# Patient Record
Sex: FEMALE | Race: WHITE | NOT HISPANIC OR LATINO
[De-identification: names, ages, dates, MRNs, and addresses within clinical notes are randomized per-mention and may not be internally consistent; named-entity substitution may affect disease eponyms.]

---

## 2020-03-02 ENCOUNTER — HEALTH MAINTENANCE LETTER (OUTPATIENT)
Age: 70
End: 2020-03-02

## 2020-06-03 ENCOUNTER — TRANSFERRED RECORDS (OUTPATIENT)
Dept: HEALTH INFORMATION MANAGEMENT | Facility: CLINIC | Age: 70
End: 2020-06-03

## 2020-12-14 ENCOUNTER — HEALTH MAINTENANCE LETTER (OUTPATIENT)
Age: 70
End: 2020-12-14

## 2021-04-18 ENCOUNTER — HEALTH MAINTENANCE LETTER (OUTPATIENT)
Age: 71
End: 2021-04-18

## 2021-05-27 ENCOUNTER — RECORDS - HEALTHEAST (OUTPATIENT)
Dept: ADMINISTRATIVE | Facility: CLINIC | Age: 71
End: 2021-05-27

## 2021-10-02 ENCOUNTER — HEALTH MAINTENANCE LETTER (OUTPATIENT)
Age: 71
End: 2021-10-02

## 2022-03-19 ENCOUNTER — HEALTH MAINTENANCE LETTER (OUTPATIENT)
Age: 72
End: 2022-03-19

## 2022-05-14 ENCOUNTER — HEALTH MAINTENANCE LETTER (OUTPATIENT)
Age: 72
End: 2022-05-14

## 2022-05-24 ENCOUNTER — TRANSFERRED RECORDS (OUTPATIENT)
Dept: HEALTH INFORMATION MANAGEMENT | Facility: CLINIC | Age: 72
End: 2022-05-24
Payer: COMMERCIAL

## 2022-09-03 ENCOUNTER — HEALTH MAINTENANCE LETTER (OUTPATIENT)
Age: 72
End: 2022-09-03

## 2023-06-03 ENCOUNTER — HEALTH MAINTENANCE LETTER (OUTPATIENT)
Age: 73
End: 2023-06-03

## 2023-12-15 ENCOUNTER — TRANSFERRED RECORDS (OUTPATIENT)
Dept: HEALTH INFORMATION MANAGEMENT | Facility: CLINIC | Age: 73
End: 2023-12-15

## 2024-11-23 ENCOUNTER — HEALTH MAINTENANCE LETTER (OUTPATIENT)
Age: 74
End: 2024-11-23

## 2025-01-29 ENCOUNTER — OFFICE VISIT (OUTPATIENT)
Dept: FAMILY MEDICINE | Facility: CLINIC | Age: 75
End: 2025-01-29
Payer: COMMERCIAL

## 2025-01-29 VITALS
OXYGEN SATURATION: 100 % | BODY MASS INDEX: 19.5 KG/M2 | HEART RATE: 85 BPM | WEIGHT: 114.2 LBS | RESPIRATION RATE: 20 BRPM | HEIGHT: 64 IN | DIASTOLIC BLOOD PRESSURE: 64 MMHG | SYSTOLIC BLOOD PRESSURE: 118 MMHG | TEMPERATURE: 98.3 F

## 2025-01-29 DIAGNOSIS — C91.10 CHRONIC LYMPHOCYTIC LEUKEMIA (H): Primary | ICD-10-CM

## 2025-01-29 DIAGNOSIS — M81.0 OSTEOPOROSIS, UNSPECIFIED OSTEOPOROSIS TYPE, UNSPECIFIED PATHOLOGICAL FRACTURE PRESENCE: ICD-10-CM

## 2025-01-29 DIAGNOSIS — N95.2 VAGINAL ATROPHY: ICD-10-CM

## 2025-01-29 DIAGNOSIS — D50.0 IRON DEFICIENCY ANEMIA DUE TO CHRONIC BLOOD LOSS: ICD-10-CM

## 2025-01-29 DIAGNOSIS — E78.2 MIXED HYPERLIPIDEMIA: ICD-10-CM

## 2025-01-29 DIAGNOSIS — B00.9 HSV (HERPES SIMPLEX VIRUS) INFECTION: ICD-10-CM

## 2025-01-29 PROBLEM — M70.61 GREATER TROCHANTERIC BURSITIS OF RIGHT HIP: Status: ACTIVE | Noted: 2024-06-26

## 2025-01-29 PROBLEM — M25.551 CHRONIC HIP PAIN, RIGHT: Status: ACTIVE | Noted: 2024-06-26

## 2025-01-29 PROBLEM — G89.29 CHRONIC HIP PAIN, RIGHT: Status: ACTIVE | Noted: 2024-06-26

## 2025-01-29 PROBLEM — E78.5 HYPERLIPIDEMIA: Status: ACTIVE | Noted: 2017-02-27

## 2025-01-29 PROCEDURE — 99204 OFFICE O/P NEW MOD 45 MIN: CPT | Performed by: INTERNAL MEDICINE

## 2025-01-29 RX ORDER — METHOCARBAMOL 500 MG/1
500 TABLET, FILM COATED ORAL
COMMUNITY
Start: 2024-05-28

## 2025-01-29 RX ORDER — MINOXIDIL 2.5 MG/1
0.5 TABLET ORAL
COMMUNITY
Start: 2024-12-03

## 2025-01-29 RX ORDER — IBUPROFEN 200 MG
200-400 TABLET ORAL
COMMUNITY

## 2025-01-29 RX ORDER — VALACYCLOVIR HYDROCHLORIDE 1 G/1
2000 TABLET, FILM COATED ORAL 2 TIMES DAILY
COMMUNITY
Start: 2025-01-29

## 2025-01-29 RX ORDER — TRAZODONE HYDROCHLORIDE 50 MG/1
1 TABLET, FILM COATED ORAL AT BEDTIME
COMMUNITY
Start: 2024-05-28 | End: 2025-05-28

## 2025-01-29 ASSESSMENT — PAIN SCALES - GENERAL: PAINLEVEL_OUTOF10: NO PAIN (0)

## 2025-01-29 NOTE — PATIENT INSTRUCTIONS
Download [employer]  Username: JPIERMAN     I will send you a [employer] message about Cardiology and CT calcium score    Thank you for coming to see me today! Here are a couple of pieces of information about my schedule and communication practices.     I am not in the clinic on Thursdays, although I do try to check in on my inbox even when I'm  out. Non-urgent calls and messages received on Thursdays may not be addressed until I am back in the office. Urgent calls will be addressed by a covering clinician.       If lab work was done today as part of your evaluation you will generally be contacted via [employer], mail, or phone with the results within 7-10 days.  If there is an alarming/concerning result we will contact you by phone. Lab results come back at varying times, I generally wait until all labs are resulted before making comments on results. Please note, labs are automatically released to [employer] once available, but it may take a couple of days for my interpretation note to appear.     I try very hard to respond to medical messages within 3 business days of receiving them- although it can take longer because all messages go to our clinic nurses first who sometimes address the concern themselves. Occasionally it takes me longer if I am trying to figure out the best way to respond and need to seek guidance, do some research or dig deeper into your medical history to come up with a helpful response.     If you need refills please contact your pharmacist. They will send a refill request to me to review. Please allow 3-5 business days for us to respond to all refill requests.     Please call, send a medical message, or submit an e-visit with any questions or concerns. Thank you for trusting me to be part of your healthcare team!    Atiya Hinojosa, DO  Internal Medicine - Pediatrics Physician  [facility]

## 2025-01-29 NOTE — PROGRESS NOTES
Assessment & Plan     (C91.10) Chronic lymphocytic leukemia (H)  (primary encounter diagnosis)  Comment: Sees Dr. Toro HP Blackman Stage 0, CLL. Dx 2010. Weekly rituxan x 4 in Oct 2010 (for profound fatigue)    (N95.2) Vaginal atrophy  Comment: Uses estradiol cream prn.    (B00.9) HSV (herpes simplex virus) infection  Comment: Takes prn Valtrex.  Plan: valACYclovir (VALTREX) 1000 mg tablet    (D50.0) Iron deficiency anemia due to chronic blood loss  Comment: New since seeing dermatologist- labs checked for hair loss; unexplained FeDA- recommend colonoscopy- if negative, proceed with EGD.  Plan: Adult GI  Referral - Procedure Only    (E78.2) Mixed hyperlipidemia  Comment: [facility]- recommend checking CT calcium score- worries about statin use due to fibromyalgia and chronic muscle aches.  Plan: CT Coronary Calcium Scan    (M81.0) Osteoporosis, unspecified osteoporosis type, unspecified pathological fracture presence  Comment: Using supplements only, no medications.               Patient Instructions   Download [employer]gola  Username: DIANA     I will send you a [employer] message about Cardiology and CT calcium score    Thank you for coming to see me today! Here are a couple of pieces of information about my schedule and communication practices.     I am not in the clinic on Thursdays, although I do try to check in on my inbox even when I'm  out. Non-urgent calls and messages received on Thursdays may not be addressed until I am back in the office. Urgent calls will be addressed by a covering clinician.       If lab work was done today as part of your evaluation you will generally be contacted via [employer], mail, or phone with the results within 7-10 days.  If there is an alarming/concerning result we will contact you by phone. Lab results come back at varying times, I generally wait until all labs are resulted before making comments on results. Please note, labs are automatically released to [employer]  once available, but it may take a couple of days for my interpretation note to appear.     I try very hard to respond to medical messages within 3 business days of receiving them- although it can take longer because all messages go to our clinic nurses first who sometimes address the concern themselves. Occasionally it takes me longer if I am trying to figure out the best way to respond and need to seek guidance, do some research or dig deeper into your medical history to come up with a helpful response.     If you need refills please contact your pharmacist. They will send a refill request to me to review. Please allow 3-5 business days for us to respond to all refill requests.     Please call, send a medical message, or submit an e-visit with any questions or concerns. Thank you for trusting me to be part of your healthcare team!    Atiya Hinojosa, DO  Internal Medicine - Pediatrics Physician  [facility]        Juan Monroy is a 74 year old, presenting for the following health issues:  Establish Care and Consult (health)        1/29/2025     9:23 AM   Additional Questions   Roomed by Tia   Accompanied by alone         1/29/2025     9:23 AM   Patient Reported Additional Medications   Patient reports taking the following new medications none     History of Present Illness       Reason for visit:  New patient visit   She is taking medications regularly.     Was at  for years.    Feels like she is spiraling.    Concerned about:    - Hyperlipidemia  - Fibromyalgia, aches all the time  - Likes natural medicine    A year ago was diagnosed with right trochanteric bursitis- had 3 injections (all steroid) and went to [place] and had MRI and it showed mild to moderate arthritis in right hip.  Next step is to put a steroid shot in right hip.  And then is hoping will exercise more.    Has had difficulty sleeping because of pain at night.    Hasn't yet used Robaxin yet.  Trazodone keeps her  "asleep for only about 4 hours at a time.  Takes Deep Sleep (2 before bedtime).    Was also having hair loss- saw [facility] who did lab studies and showed iron deficiency.        Objective    /64 (BP Location: Right arm, Patient Position: Sitting, Cuff Size: Adult Regular)   Pulse 85   Temp 98.3  F (36.8  C) (Temporal)   Resp 20   Ht 1.626 m (5' 4\")   Wt 51.8 kg (114 lb 3.2 oz)   SpO2 100%   BMI 19.60 kg/m    Body mass index is 19.6 kg/m .  Physical Exam   GENERAL: alert and no distress  CV: regular rate and rhythm, normal S1 S2, no S3 or S4, no murmur, click or rub, no peripheral edema  MS: no gross musculoskeletal defects noted, no edema  PSYCH: mentation appears normal, affect normal/bright            Signed Electronically by: Atiya Hinojosa, DO    "

## 2025-02-03 ENCOUNTER — MYC MEDICAL ADVICE (OUTPATIENT)
Dept: FAMILY MEDICINE | Facility: CLINIC | Age: 75
End: 2025-02-03
Payer: COMMERCIAL

## 2025-02-03 ENCOUNTER — TELEPHONE (OUTPATIENT)
Dept: GASTROENTEROLOGY | Facility: CLINIC | Age: 75
End: 2025-02-03
Payer: COMMERCIAL

## 2025-02-03 NOTE — TELEPHONE ENCOUNTER
"Endoscopy Scheduling Screen    Have you had any respiratory illness or flu-like symptoms in the last 10 days?  No    What is your communication preference for Instructions and/or Bowel Prep?   MyChart    What insurance is in the chart?  Other:  [facility]    Ordering/Referring Provider: Atiya Hinojosa,      (If ordering provider performs procedure, schedule with ordering provider unless otherwise instructed. )    BMI: Estimated body mass index is 19.6 kg/m  as calculated from the following:    Height as of 1/29/25: 1.626 m (5' 4\").    Weight as of 1/29/25: 51.8 kg (114 lb 3.2 oz).     Sedation Ordered  moderate sedation.   If patient BMI > 50 do not schedule in ASC.    If patient BMI > 45 do not schedule at [facility].    Are you taking methadone or Suboxone?  NO, No RN review required.    Have you been diagnosed and are being treated for severe PTSD or severe anxiety?  NO, No RN review required.    Are you taking any prescription medications for pain 3 or more times per week?   NO, No RN review required.    Do you have a history of malignant hyperthermia?  No    (Females) Are you currently pregnant?   No     Have you been diagnosed or told you have pulmonary hypertension?   No    Do you have an LVAD?  No    Have you been told you have moderate to severe sleep apnea?  No.    Have you been told you have COPD, asthma, or any other lung disease?  No    Do you have any heart conditions?  No     Have you ever had or are you waiting for an organ transplant?  No. Continue scheduling, no site restrictions.    Have you had a stroke or transient ischemic attack (TIA aka \"mini stroke\" in the last 6 months?   No    Have you been diagnosed with or been told you have cirrhosis of the liver?   No.    Are you currently on dialysis?   No    Do you need assistance transferring?   No    BMI: Estimated body mass index is 19.6 kg/m  as calculated from the following:    Height as of 1/29/25: 1.626 m (5' 4\").    Weight as of " 1/29/25: 51.8 kg (114 lb 3.2 oz).     Is patients BMI > 40 and scheduling location [facility]?  No    Do you take an injectable or oral medication for weight loss or diabetes (excluding insulin)?  No    Do you take the medication Naltrexone?  No    Do you take blood thinners?  No       Prep   Are you currently on dialysis or do you have chronic kidney disease?  No    Do you have a diagnosis of diabetes?  No    Do you have a diagnosis of cystic fibrosis (CF)?  No    On a regular basis do you go 3 -5 days between bowel movements?  No    BMI > 40?  No    Preferred Pharmacy:    [id number] [address]  [address] [facility] [address]  Phone: [phone] Fax: [phone]      Final Scheduling Details     Procedure scheduled  Colonoscopy    Surgeon:  Gurjit Genao     Date of procedure:  3/5/25     Pre-OP / PAC:   No - Not required for this site.    Location  [facility] - ASC - Patient preference.    Sedation   Moderate Sedation - Per order.      Patient Reminders:   You will receive a call from a Nurse to review instructions and health history.  This assessment must be completed prior to your procedure.  Failure to complete the Nurse assessment may result in the procedure being cancelled.      On the day of your procedure, please designate an adult(s) who can drive you home stay with you for the next 24 hours. The medicines used in the exam will make you sleepy. You will not be able to drive.      You cannot take public transportation, ride share services, or non-medical taxi service without a responsible caregiver.  Medical transport services are allowed with the requirement that a responsible caregiver will receive you at your destination.  We require that drivers and caregivers are confirmed prior to your procedure.

## 2025-02-04 ENCOUNTER — ANCILLARY PROCEDURE (OUTPATIENT)
Dept: GENERAL RADIOLOGY | Facility: CLINIC | Age: 75
End: 2025-02-04
Attending: PHYSICIAN ASSISTANT
Payer: COMMERCIAL

## 2025-02-04 ENCOUNTER — NURSE TRIAGE (OUTPATIENT)
Dept: FAMILY MEDICINE | Facility: CLINIC | Age: 75
End: 2025-02-04
Payer: COMMERCIAL

## 2025-02-04 ENCOUNTER — OFFICE VISIT (OUTPATIENT)
Dept: URGENT CARE | Facility: URGENT CARE | Age: 75
End: 2025-02-04
Payer: COMMERCIAL

## 2025-02-04 VITALS
HEIGHT: 64 IN | WEIGHT: 114 LBS | HEART RATE: 93 BPM | DIASTOLIC BLOOD PRESSURE: 79 MMHG | BODY MASS INDEX: 19.46 KG/M2 | RESPIRATION RATE: 16 BRPM | TEMPERATURE: 98.9 F | SYSTOLIC BLOOD PRESSURE: 142 MMHG | OXYGEN SATURATION: 97 %

## 2025-02-04 DIAGNOSIS — R10.11 RIGHT UPPER QUADRANT ABDOMINAL PAIN: ICD-10-CM

## 2025-02-04 DIAGNOSIS — R10.11 RIGHT UPPER QUADRANT ABDOMINAL PAIN: Primary | ICD-10-CM

## 2025-02-04 LAB
ALBUMIN SERPL BCG-MCNC: 4.4 G/DL (ref 3.5–5.2)
ALBUMIN UR-MCNC: NEGATIVE MG/DL
ALP SERPL-CCNC: 86 U/L (ref 40–150)
ALT SERPL W P-5'-P-CCNC: 44 U/L (ref 0–50)
ANION GAP SERPL CALCULATED.3IONS-SCNC: 13 MMOL/L (ref 7–15)
APPEARANCE UR: CLEAR
AST SERPL W P-5'-P-CCNC: 37 U/L (ref 0–45)
BACTERIA #/AREA URNS HPF: ABNORMAL /HPF
BILIRUB SERPL-MCNC: 0.6 MG/DL
BILIRUB UR QL STRIP: NEGATIVE
BUN SERPL-MCNC: 11.9 MG/DL (ref 8–23)
CALCIUM SERPL-MCNC: 9.7 MG/DL (ref 8.8–10.4)
CHLORIDE SERPL-SCNC: 98 MMOL/L (ref 98–107)
COLOR UR AUTO: YELLOW
CREAT SERPL-MCNC: 0.54 MG/DL (ref 0.51–0.95)
CRP SERPL-MCNC: <3 MG/L
EGFRCR SERPLBLD CKD-EPI 2021: >90 ML/MIN/1.73M2
ERYTHROCYTE [DISTWIDTH] IN BLOOD BY AUTOMATED COUNT: 14.3 % (ref 10–15)
GLUCOSE SERPL-MCNC: 106 MG/DL (ref 70–99)
GLUCOSE UR STRIP-MCNC: NEGATIVE MG/DL
HCO3 SERPL-SCNC: 24 MMOL/L (ref 22–29)
HCT VFR BLD AUTO: 40.9 % (ref 35–47)
HGB BLD-MCNC: 13.6 G/DL (ref 11.7–15.7)
HGB UR QL STRIP: ABNORMAL
KETONES UR STRIP-MCNC: NEGATIVE MG/DL
LEUKOCYTE ESTERASE UR QL STRIP: NEGATIVE
MCH RBC QN AUTO: 30.2 PG (ref 26.5–33)
MCHC RBC AUTO-ENTMCNC: 33.3 G/DL (ref 31.5–36.5)
MCV RBC AUTO: 91 FL (ref 78–100)
MUCOUS THREADS #/AREA URNS LPF: PRESENT /LPF
NITRATE UR QL: NEGATIVE
PH UR STRIP: 5.5 [PH] (ref 5–7)
PLATELET # BLD AUTO: 296 10E3/UL (ref 150–450)
POTASSIUM SERPL-SCNC: 4.1 MMOL/L (ref 3.4–5.3)
PROT SERPL-MCNC: 7.3 G/DL (ref 6.4–8.3)
RBC # BLD AUTO: 4.51 10E6/UL (ref 3.8–5.2)
RBC #/AREA URNS AUTO: ABNORMAL /HPF
SODIUM SERPL-SCNC: 135 MMOL/L (ref 135–145)
SP GR UR STRIP: 1.02 (ref 1–1.03)
SQUAMOUS #/AREA URNS AUTO: ABNORMAL /LPF
UROBILINOGEN UR STRIP-ACNC: 0.2 E.U./DL
WBC # BLD AUTO: 20.7 10E3/UL (ref 4–11)
WBC #/AREA URNS AUTO: ABNORMAL /HPF

## 2025-02-04 PROCEDURE — 80053 COMPREHEN METABOLIC PANEL: CPT | Performed by: PHYSICIAN ASSISTANT

## 2025-02-04 PROCEDURE — 86140 C-REACTIVE PROTEIN: CPT | Performed by: PHYSICIAN ASSISTANT

## 2025-02-04 PROCEDURE — 36415 COLL VENOUS BLD VENIPUNCTURE: CPT | Performed by: PHYSICIAN ASSISTANT

## 2025-02-04 PROCEDURE — 74019 RADEX ABDOMEN 2 VIEWS: CPT | Mod: TC | Performed by: RADIOLOGY

## 2025-02-04 PROCEDURE — 85027 COMPLETE CBC AUTOMATED: CPT | Performed by: PHYSICIAN ASSISTANT

## 2025-02-04 PROCEDURE — 81001 URINALYSIS AUTO W/SCOPE: CPT | Performed by: PHYSICIAN ASSISTANT

## 2025-02-04 PROCEDURE — 99214 OFFICE O/P EST MOD 30 MIN: CPT | Performed by: PHYSICIAN ASSISTANT

## 2025-02-04 RX ORDER — OMEPRAZOLE 20 MG/1
20 CAPSULE, DELAYED RELEASE ORAL DAILY
Qty: 30 CAPSULE | Refills: 0 | Status: SHIPPED | OUTPATIENT
Start: 2025-02-04 | End: 2025-03-06

## 2025-02-04 ASSESSMENT — PAIN SCALES - GENERAL: PAINLEVEL_OUTOF10: MODERATE PAIN (4)

## 2025-02-04 NOTE — TELEPHONE ENCOUNTER
"Care Advice:  Go to ED/UCC now (or to office with pcp approval)    Disposition:  Patient agreed to go to an urgent care at the  right away.  Was seen by pcp to establish care last week.      Reason for Disposition   MILD TO MODERATE constant pain lasting > 2 hours, and age > 60 years    Additional Information   Negative: Passed out (e.g., fainted, lost consciousness, blacked out and was not responding)   Negative: Shock suspected (e.g., cold/pale/clammy skin, too weak to stand, low BP, rapid pulse)   Negative: Sounds like a life-threatening emergency to the triager   Negative: Followed an abdomen (stomach) injury   Negative: Chest pain   Negative: Abdominal pain and pregnant < 20 weeks   Negative: Abdominal pain and pregnant 20 or more weeks   Negative: Pain is mainly in upper abdomen (if needed ask: 'is it mainly above the belly button?')   Negative: Abdomen bloating or swelling are main symptoms   Negative: SEVERE abdominal pain (e.g., excruciating)   Negative: Vomiting red blood or black (coffee ground) material   Negative: Blood in bowel movements  (Exception: Blood on surface of BM with constipation.)   Negative: Black or tarry bowel movements  (Exception: Chronic-unchanged black-grey BMs AND is taking iron pills or Pepto-Bismol.)   Negative: MILD TO MODERATE constant pain lasting > 2 hours   Negative: Vomiting bile (green color)   Negative: Patient sounds very sick or weak to the triager    Answer Assessment - Initial Assessment Questions  1. LOCATION: \"Where does it hurt?\"      Center of abdomen towards right below the breast bone.  The pain is consistent more in the center. However, as speaking to nurse, pain is shifting towards right sided of the abdomen.     2. RADIATION: \"Does the pain shoot anywhere else?\" (e.g., chest, back)      More center of the abdomen    3. ONSET: \"When did the pain begin?\" (e.g., minutes, hours or days ago)       Since Thursday or Friday.  Since then, it has been everyday.    4. " "SUDDEN: \"Gradual or sudden onset?\"      Woke up with it.      5. PATTERN \"Does the pain come and go, or is it constant?\"      Pain does not go away.  Worsening at bending.  Felt better at rest.  Have not take any pain medication for it.  Has tried Gas-X, natural laxative to be effective.  Patient reported regular bowel movement daily.     6. SEVERITY: \"How bad is the pain?\"  (e.g., Scale 1-10; mild, moderate, or severe)      3/10    7. RECURRENT SYMPTOM: \"Have you ever had this type of stomach pain before?\" If Yes, ask: \"When was the last time?\" and \"What happened that time?\"       Never had this issue in the past.  Does not recall historical abdominal injury.    8. CAUSE: \"What do you think is causing the stomach pain?\"      Unknown    9. RELIEVING/AGGRAVATING FACTORS: \"What makes it better or worse?\" (e.g., antacids, bending or twisting motion, bowel movement)      Relax/laying flat made symptom better. Patient reported has had chronic back pain.  But this pain seemed to have made the back worsening.   Hurts the worst at bending or bump mid-section with a book.    10. OTHER SYMPTOMS: \"Do you have any other symptoms?\" (e.g., back pain, diarrhea, fever, urination pain, vomiting)        Denied other than chronic mid back pain - muscle aches associated with fibra myalgia  .   11. PREGNANCY: \"Is there any chance you are pregnant?\" \"When was your last menstrual period?\"        N/a    Protocols used: Abdominal Pain - Female-A-[place]    "

## 2025-02-04 NOTE — PROGRESS NOTES
"SUBJECTIVE  HPI:  Eliana Power is a 74 year old female who presents with the CC of abdominal/pelvic pain.  Pain is located in the RUQ area, with radiation to back.  The pain is characterized as burning, and at worst is a level 4 on a scale of 1-10.  Pain has been present for 5 day(s) and is fluctuating.  EXACERBATING FACTORS: nothing  RELIEVING FACTORS: nothing.  ASSOCIATED SX: anorexia.    Past Medical History:   Diagnosis Date    Chronic lower back pain     CLL (chronic lymphocytic leukaemia)     DX age 60    Fibromyalgia     dx 1992    Hyperlipidemia LDL goal < 160     Insomnia     Vitamin D deficiency      Current Outpatient Medications   Medication Sig Dispense Refill    Ascorbic Acid 1000 MG TBCR Take 1 tablet by mouth daily.      DiphenhydrAMINE HCl (BENADRYL PO) Take  by mouth every evening as needed.      estradiol (ESTRACE VAGINAL) 0.1 MG/GM vaginal cream Place 2 g vaginally twice a week Fill on patient request 30 g 4    ibuprofen (ADVIL/MOTRIN) 200 MG tablet Take 200-400 mg by mouth.      minoxidil (LONITEN) 2.5 MG tablet Take 0.5 tablets by mouth daily at 2 pm.      Multiple Vitamin (MULTIVITAMIN ADULT PO) Take by mouth.      traZODone (DESYREL) 50 MG tablet Take 1 tablet by mouth at bedtime.      valACYclovir (VALTREX) 1000 mg tablet Take 2 tablets (2,000 mg) by mouth 2 times daily. As needed for cold sore.      methocarbamol (ROBAXIN) 500 MG tablet Take 500 mg by mouth. (Patient not taking: Reported on 2/4/2025)       Social History     Tobacco Use    Smoking status: Never    Smokeless tobacco: Never   Substance Use Topics    Alcohol use: Yes     Alcohol/week: 1.7 - 2.5 standard drinks of alcohol     Types: 2 - 3 drink(s) per week       ROS:  Review of systems negative except as stated above.    OBJECTIVE:  BP (!) 142/79   Pulse 93   Temp 98.9  F (37.2  C) (Temporal)   Resp 16   Ht 1.626 m (5' 4\")   Wt 51.7 kg (114 lb)   SpO2 97%   BMI 19.57 kg/m    GENERAL APPEARANCE: healthy, alert and " no distress  RESP: lungs clear to auscultation - no rales, rhonchi or wheezes  CV: regular rates and rhythm, normal S1 S2, no murmur noted  ABDOMEN:  soft, nontender, no HSM or masses and bowel sounds normal  NEURO: Normal strength and tone, sensory exam grossly normal,  normal speech and mentation  SKIN: no suspicious lesions or rashes      Results for orders placed or performed in visit on 02/04/25   XR Abdomen 2 Views     Status: None    Narrative    EXAM: XR ABDOMEN 2 VIEWS  LOCATION: [facility]  DATE: 2/4/2025    INDICATION:  Right upper quadrant abdominal pain  COMPARISON: Pelvis radiograph 12/27/2024.      Impression    IMPRESSION: No evidence of bowel obstruction or pneumoperitoneum. Moderate volume formed stool in colon. Visualized lung bases are clear. No acute bony abnormality.   Results for orders placed or performed in visit on 02/04/25   UA Macroscopic with reflex to Microscopic and Culture - Clinic Collect     Status: Abnormal    Specimen: Urine, Midstream   Result Value Ref Range    Color Urine Yellow Colorless, Straw, Light Yellow, Yellow    Appearance Urine Clear Clear    Glucose Urine Negative Negative mg/dL    Bilirubin Urine Negative Negative    Ketones Urine Negative Negative mg/dL    Specific Gravity Urine 1.025 1.003 - 1.035    Blood Urine Trace (A) Negative    pH Urine 5.5 5.0 - 7.0    Protein Albumin Urine Negative Negative mg/dL    Urobilinogen Urine 0.2 0.2, 1.0 E.U./dL    Nitrite Urine Negative Negative    Leukocyte Esterase Urine Negative Negative   CBC with platelets     Status: Abnormal   Result Value Ref Range    WBC Count 20.7 (H) 4.0 - 11.0 10e3/uL    RBC Count 4.51 3.80 - 5.20 10e6/uL    Hemoglobin 13.6 11.7 - 15.7 g/dL    Hematocrit 40.9 35.0 - 47.0 %    MCV 91 78 - 100 fL    MCH 30.2 26.5 - 33.0 pg    MCHC 33.3 31.5 - 36.5 g/dL    RDW 14.3 10.0 - 15.0 %    Platelet Count 296 150 - 450 10e3/uL   Comprehensive metabolic panel (BMP + Alb, Alk Phos, ALT,  AST, Total. Bili, TP)     Status: Abnormal   Result Value Ref Range    Sodium 135 135 - 145 mmol/L    Potassium 4.1 3.4 - 5.3 mmol/L    Carbon Dioxide (CO2) 24 22 - 29 mmol/L    Anion Gap 13 7 - 15 mmol/L    Urea Nitrogen 11.9 8.0 - 23.0 mg/dL    Creatinine 0.54 0.51 - 0.95 mg/dL    GFR Estimate >90 >60 mL/min/1.73m2    Calcium 9.7 8.8 - 10.4 mg/dL    Chloride 98 98 - 107 mmol/L    Glucose 106 (H) 70 - 99 mg/dL    Alkaline Phosphatase 86 40 - 150 U/L    AST 37 0 - 45 U/L    ALT 44 0 - 50 U/L    Protein Total 7.3 6.4 - 8.3 g/dL    Albumin 4.4 3.5 - 5.2 g/dL    Bilirubin Total 0.6 <=1.2 mg/dL   UA Microscopic with Reflex to Culture     Status: Abnormal   Result Value Ref Range    Bacteria Urine Few (A) None Seen /HPF    RBC Urine 0-2 0-2 /HPF /HPF    WBC Urine None Seen 0-5 /HPF /HPF    Squamous Epithelials Urine Few (A) None Seen /LPF    Mucus Urine Present (A) None Seen /LPF    Narrative    Urine Culture not indicated   CRP, inflammation     Status: Normal   Result Value Ref Range    CRP Inflammation <3.00 <5.00 mg/L       ASSESSMENT:  (R10.11) Right upper quadrant abdominal pain  (primary encounter diagnosis)  Plan: UA Macroscopic with reflex to Microscopic and         Culture - Clinic Collect, XR Abdomen 2 Views,         CBC with platelets, Comprehensive metabolic         panel (BMP + Alb, Alk Phos, ALT, AST, Total.         Bili, TP), lidocaine (viscous) (XYLOCAINE) 2 %         15 mL, alum & mag hydroxide-simethicone         (MAALOX) 15 mL GI Cocktail, UA Microscopic with        Reflex to Culture, CRP, inflammation,         omeprazole (PRILOSEC) 20 MG DR capsule,         CANCELED: Referral to Acute and Diagnostic         Services (Day of diagnostic / First order         acute)      ER with worsening    Follow with PCP in 2 weeks     Return with new symptoms

## 2025-02-05 ENCOUNTER — TELEPHONE (OUTPATIENT)
Dept: GASTROENTEROLOGY | Facility: CLINIC | Age: 75
End: 2025-02-05

## 2025-02-05 NOTE — TELEPHONE ENCOUNTER
Writer responded via [employer].  ESTEFANIA GiangN, RN-BC  [facility]

## 2025-02-05 NOTE — TELEPHONE ENCOUNTER
Caller: Writer to pt     Reason for Reschedule/Cancellation (please be detailed, any staff messages or encounters to note?):   Pt needs MAC d/t tortuous colon    Did you cancel or rescheduled an EUS procedure? No.    Is screening questionnaire older than 3 months from the reschedule date.   If Yes, please complete screening questionnaire. No    Prior to reschedule please review:  Ordering Provider: Atiya Hinojosa DO  Sedation Determined: MAC  Does patient have any ASC Exclusions, please identify?: No    Notes on Cancelled Procedure:  Procedure: Lower Endoscopy [Colonoscopy]   Date: 03/05/2025  Location: [facility]; [address] [place] [address], [address] [place] [address]  Surgeon: CHITRA    Rescheduled: Yes,   Procedure: Lower Endoscopy [Colonoscopy]    Date: 02/21/2025   Location: [facility] [place]; [address] [place] [address], [address] [place] [address]   Surgeon: WILLIAM   Sedation Level Scheduled  MAC ,  Reason for Sedation Level Per RN   Instructions updated and sent: Yes, Sammy     Does patient need PAC or Pre -Op Rescheduled? : No

## 2025-02-06 ENCOUNTER — MYC MEDICAL ADVICE (OUTPATIENT)
Dept: FAMILY MEDICINE | Facility: CLINIC | Age: 75
End: 2025-02-06
Payer: COMMERCIAL

## 2025-02-07 ENCOUNTER — HOSPITAL ENCOUNTER (OUTPATIENT)
Dept: CT IMAGING | Facility: CLINIC | Age: 75
Discharge: HOME | End: 2025-02-07
Attending: INTERNAL MEDICINE | Admitting: INTERNAL MEDICINE
Payer: COMMERCIAL

## 2025-02-07 DIAGNOSIS — E78.2 MIXED HYPERLIPIDEMIA: ICD-10-CM

## 2025-02-07 PROCEDURE — 75571 CT HRT W/O DYE W/CA TEST: CPT | Mod: 26 | Performed by: INTERNAL MEDICINE

## 2025-02-07 PROCEDURE — 75571 CT HRT W/O DYE W/CA TEST: CPT

## 2025-02-08 ENCOUNTER — HEALTH MAINTENANCE LETTER (OUTPATIENT)
Age: 75
End: 2025-02-08

## 2025-02-08 ENCOUNTER — TELEPHONE (OUTPATIENT)
Dept: GASTROENTEROLOGY | Facility: CLINIC | Age: 75
End: 2025-02-08
Payer: COMMERCIAL

## 2025-02-08 NOTE — TELEPHONE ENCOUNTER
Pre visit planning completed.      Procedure details:    Patient scheduled for Colonoscopy on 2/21/25.     Arrival time: 0700. Procedure time 0800    Facility location: [facility]; [address] [place] [address], [address] Floor, [place] [address]. Check in location: 5th Floor.    Sedation type: MAC    Pre op exam needed? No.    Indication for procedure: iron deficiency anemia       Chart review:     Electronic implanted devices? No    Recent diagnosis of diverticulitis within the last 6 weeks? No      Medication review:    Diabetic? No    Anticoagulants? No    Weight loss medication/injectable? No GLP-1 medication per patient's medication list. Nursing to verify with pre-assessment call.    Other medication HOLDING recommendations:  N/A      Prep for procedure:     Bowel prep recommendation: Standard Miralax.   Due to: standard bowel prep    Procedure information and instructions sent via [employer]         Lilly Bryant RN  Endoscopy Procedure Pre Assessment   [phone] option 2

## 2025-02-10 LAB
CV CALCIUM SCORE AGATSTON LM: 5
CV CALCIUM SCORING AGATSON LAD: 180
CV CALCIUM SCORING AGATSTON CX: 0
CV CALCIUM SCORING AGATSTON RCA: 0
CV CALCIUM SCORING AGATSTON TOTAL: 185

## 2025-02-10 NOTE — RESULT ENCOUNTER NOTE
Dear Eliana,    So far I just have the lung portion of your CT scan which shows footprints of likely an old lung infection or other inflammatory process but nothing else concerning- I will watch for the heart portion of this test and let you know once I've seen it.    Kind regards,    Atiya Hinojosa, DO  Internal Medicine - Pediatrics Physician  [facility]

## 2025-02-10 NOTE — TELEPHONE ENCOUNTER
: I see the GI procedure referral, but I can't tell if it is intended for both lower and upper GI.  Can you confirm no other order is needed?    Thankyou for your response. I m sorry I used the wrong term in the previous message for the test we talked about. We discussed having the colonoscopy and a stomach scope test at the same time. I suggested not the same day. My question is if there is an order for the stomach scope before our April appointment?     KELSEY Coley BSN, PHN, RN-[facility]  [phone]

## 2025-02-10 NOTE — RESULT ENCOUNTER NOTE
Dear Eliana,    Thank you for getting this test done.  It does in fact show coronary artery calcifications.  That suggests that you may benefit from a statin medicine to reduce your risk of heart attack and stroke.  I know you are concerned about this class of medications because of the side effect of muscle pain in the setting of your pre-existing pain.  Please let me know if this is something you'd like to talk more about before our visit in April- I'd be happy to talk over the phone to talk about this option.    Atiya Hinojosa, DO  Internal Medicine - Pediatrics Physician  [facility]

## 2025-02-10 NOTE — TELEPHONE ENCOUNTER
Pre assessment completed for upcoming procedure.      Procedure details:    Patient scheduled for Colonoscopy on 2/21/25.     Arrival time: 0700. Procedure time 0800     Facility location: [facility]; [address] [place] [address], 5[address] Floor, [place] [address]. Check in location: 5th Floor.     Sedation type: MAC     Pre op exam needed? No.     Indication for procedure: iron deficiency anemia     Designated  policy reviewed. Instructed to have someone stay 24 hours post procedure.       Chart review:     Electronic implanted devices? No    Recent diagnosis of diverticulitis within the last 6 weeks?  No      Medication review:    Diabetic? No    Anticoagulants? No    Weight loss medication/injectable? No.    Other medication HOLDING recommendations:  Ferrous sulfate (iron supplements): HOLD 7 days before procedure.      Prep for procedure:     Reviewed procedure prep instructions.     Patient verbalized understanding and had no questions or concerns at this time.        Mary Lofton LPN  Endoscopy Procedure Pre Assessment   [phone] option 2

## 2025-02-11 NOTE — TELEPHONE ENCOUNTER
Writer relayed pcp's message to pt via [employer]aris.    Jone Sherman, BSN, PHN, RN-[facility]

## 2025-02-11 NOTE — TELEPHONE ENCOUNTER
I only placed the colonoscopy order with the intent being that if it's negative, THEN I would order the EGD for further evaluation.  Sometimes, the GI doctor will do that automatically pending results of the lower GI.    Thank you!     Atiya Hinojosa, DO  Internal Medicine - Pediatrics Physician  [facility]

## 2025-02-16 NOTE — PROGRESS NOTES
Pre-procedure note:     75 yo F with a PMH of HLD and CLL, who was referred for elective colonoscopy for evaluation of reported SELVIN. Outside labs showed acute HGB drop from 13 to 11 but no recent iron studies. No reported family history of CRC.     Ref: Atiya Hinojosa, DO

## 2025-02-21 ENCOUNTER — HOSPITAL ENCOUNTER (OUTPATIENT)
Facility: AMBULATORY SURGERY CENTER | Age: 75
Discharge: HOME | End: 2025-02-21
Attending: INTERNAL MEDICINE | Admitting: INTERNAL MEDICINE
Payer: COMMERCIAL

## 2025-02-21 VITALS
BODY MASS INDEX: 19.46 KG/M2 | WEIGHT: 114 LBS | RESPIRATION RATE: 16 BRPM | HEART RATE: 102 BPM | HEIGHT: 64 IN | SYSTOLIC BLOOD PRESSURE: 125 MMHG | DIASTOLIC BLOOD PRESSURE: 68 MMHG | OXYGEN SATURATION: 99 % | TEMPERATURE: 97.7 F

## 2025-02-21 LAB — COLONOSCOPY: NORMAL

## 2025-02-21 PROCEDURE — 88305 TISSUE EXAM BY PATHOLOGIST: CPT | Mod: TC | Performed by: INTERNAL MEDICINE

## 2025-02-21 PROCEDURE — 45380 COLONOSCOPY AND BIOPSY: CPT | Performed by: INTERNAL MEDICINE

## 2025-02-21 PROCEDURE — 88305 TISSUE EXAM BY PATHOLOGIST: CPT | Mod: 26 | Performed by: PATHOLOGY

## 2025-02-21 RX ORDER — LIDOCAINE 40 MG/G
CREAM TOPICAL
Status: DISCONTINUED | OUTPATIENT
Start: 2025-02-21 | End: 2025-02-21 | Stop reason: HOSPADM

## 2025-02-21 RX ORDER — PROCHLORPERAZINE MALEATE 5 MG/1
5 TABLET ORAL EVERY 6 HOURS PRN
Status: DISCONTINUED | OUTPATIENT
Start: 2025-02-21 | End: 2025-02-22 | Stop reason: HOSPADM

## 2025-02-21 RX ORDER — NALOXONE HYDROCHLORIDE 0.4 MG/ML
0.2 INJECTION, SOLUTION INTRAMUSCULAR; INTRAVENOUS; SUBCUTANEOUS
Status: DISCONTINUED | OUTPATIENT
Start: 2025-02-21 | End: 2025-02-22 | Stop reason: HOSPADM

## 2025-02-21 RX ORDER — ONDANSETRON 4 MG/1
4 TABLET, ORALLY DISINTEGRATING ORAL EVERY 6 HOURS PRN
Status: DISCONTINUED | OUTPATIENT
Start: 2025-02-21 | End: 2025-02-22 | Stop reason: HOSPADM

## 2025-02-21 RX ORDER — ONDANSETRON 2 MG/ML
4 INJECTION INTRAMUSCULAR; INTRAVENOUS EVERY 6 HOURS PRN
Status: DISCONTINUED | OUTPATIENT
Start: 2025-02-21 | End: 2025-02-22 | Stop reason: HOSPADM

## 2025-02-21 RX ORDER — FLUMAZENIL 0.1 MG/ML
0.2 INJECTION, SOLUTION INTRAVENOUS
Status: ACTIVE | OUTPATIENT
Start: 2025-02-21 | End: 2025-02-21

## 2025-02-21 RX ORDER — NALOXONE HYDROCHLORIDE 0.4 MG/ML
0.4 INJECTION, SOLUTION INTRAMUSCULAR; INTRAVENOUS; SUBCUTANEOUS
Status: DISCONTINUED | OUTPATIENT
Start: 2025-02-21 | End: 2025-02-22 | Stop reason: HOSPADM

## 2025-02-21 RX ORDER — ONDANSETRON 2 MG/ML
4 INJECTION INTRAMUSCULAR; INTRAVENOUS
Status: DISCONTINUED | OUTPATIENT
Start: 2025-02-21 | End: 2025-02-21 | Stop reason: HOSPADM

## 2025-02-21 NOTE — LETTER
"February 27, 2025      Eliana Bernardmundo  [address]  [facility] [address]        Dear Ms.JannetMarion Eliana Power,    The polyp that was removed during your colonoscopy returned as normal tissue. I recommend you have a repeat colonoscopy in 10 years to look for any new polyps.    Tevin Aricniega MD  [facility]  Division of Gastroenterology and Hepatology  [address] [place] [id number]     Resulted Orders   Surgical Pathology Exam   Result Value Ref Range    Case Report       Surgical Pathology Report                         Case: [id number]                                  Authorizing Provider:  Tevin Arciniega MD      Collected:           02/21/2025 08:30 AM          Ordering Location:     [facility] OR  Received:            02/21/2025 08:45 AM                                 [place]                                                                  Pathologist:           Esperanza Callahan MD                                                           Specimen:    Large Intestine, Colon, Transverse, Transverse Colon Polyp                                 Final Diagnosis       TRANSVERSE COLON, POLYP, BIOPSY:  Colonic mucosa with lymphoid follicle; no neoplastic or hyperplastic polyp identified       Clinical Information       Pre-op Diagnosis: Iron deficiency anemia due to chronic blood loss      Gross Description       A(1). Large Intestine, Colon, Transverse, Transverse Colon Polyp:  The specimen is received in formalin with proper patient identification, labeled \"large intestine, transverse colon polyp\".  The specimen consists of a single 0.5 cm in greatest dimension.  Gray-tan, irregular fragment of mucosal tissue.  The specimen is submitted in toto as A1.      Microscopic Description       Microscopic examination has been performed.      I have personally reviewed all specimens and or slides, including the listed " special stains, and used them with my medical judgement to determine the final diagnosis.       Performing Labs       The technical component of this testing was completed at [facility].    Stain controls for all stains resulted within this report have been reviewed and show appropriate reactivity.       Case Images         If you have any questions or concerns, please call the clinic at the number listed above.       Sincerely,      Tevin Arciniega MD    Electronically signed

## 2025-02-21 NOTE — H&P
"Gastroenterology Pre-op History and Physical    Eliana Power MRN# [id number]   Age: 74 year old YOB: 1950      Date of Surgery: 02/21/25  [facility]      Date of Exam 2/21/2025 Facility Same Day       Primary care provider: Atiya Hinojosa         Chief Complaint and/or Reason for Procedure:     75 yo F with a PMH of HLD and CLL, who was referred for elective colonoscopy for evaluation of reported SELVIN. Outside labs showed acute HGB drop from 13 to 11 but no recent iron studies. No reported family history of CRC.          Past Medical and Surgical History:     Past Medical History:   Diagnosis Date    Chronic lower back pain     CLL (chronic lymphocytic leukaemia)     DX age 60    Fibromyalgia     dx 1992    Hyperlipidemia LDL goal < 160     Insomnia     Vitamin D deficiency      Past Surgical History:   Procedure Laterality Date    HYSTERECTOMY VAGINAL  1984            Medications (include herbals and vitamins):        (Not in a hospital admission)            Allergies:      Allergies   Allergen Reactions    Contrast Dye Hives and Itching     9/17/10 Developed hives. One on neck, stomach, right side.  No breathing problems.      9/17/10 Developed hives. One on neck, stomach, right side.  No breathing problems.               Physical Exam:   All vitals have been reviewed  Patient Vitals for the past 8 hrs:   BP Temp Temp src Pulse Resp SpO2 Height Weight   02/21/25 0845 108/58 -- -- -- 14 98 % -- --   02/21/25 0840 101/51 97.7  F (36.5  C) Temporal 89 14 97 % -- --   02/21/25 0700 138/79 97  F (36.1  C) Temporal 110 16 98 % 1.626 m (5' 4\") 51.7 kg (114 lb)     No intake/output data recorded.    General: Lying in bed, in NAD  ENT: Normocephalic, atraumatic   Lungs: Normal work of breathing   Cardiovascular: Normal rate, regular rhythm   Abdomen: Soft, non-tender             Anesthetic risk and/or ASA classification:   ASA: 2    Tevin Arciniega MD "

## 2025-02-21 NOTE — DISCHARGE INSTRUCTIONS
Discharge Instructions after Colonoscopy  or Sigmoidoscopy    Today you had a ____ Colonoscopy    Activity and Diet  You were given medicine for pain. You may be dizzy or sleepy.  For 24 hours:   Do not drive or use heavy equipment.   Do not make important decisions.   Do not drink any alcohol.  You may return to your normal diet and medicines.    Discomfort   Air was placed in your colon during the exam in order to see it. Walking helps to pass the air.   You may take Tylenol (acetaminophen) for pain unless your doctor has told you not to.  Do not take aspirin or ibuprofen (Advil, Motrin, or other anti-inflammatory  drugs) for _____ days.    Follow-up  ____ We took small tissue samples or polyps to study. Your doctor will call you with the results  within two weeks.    When to call:    Call right away if you have:   Unusual pain in belly or chest pain not relieved with passing air.   More than 1 to 2 Tablespoons of bleeding from your rectum.   Fever above 100.6  F (37.5  C).    If you have severe pain, bleeding, or shortness of breath, go to an emergency room.    If you have questions, call:  Monday to Friday, 8 a.m. to 4:30 p.m.  Central Scheduling Department: [phone]    After hours  Hospital: [phone] (Ask for the GI fellow on call)

## 2025-02-21 NOTE — LETTER
"February 27, 2025      Eliana Power  [address] [facility] [address]        Dear ,    We are writing to inform you of your test results.    {results letter list:[id number]}    Resulted Orders   Surgical Pathology Exam   Result Value Ref Range    Case Report       Surgical Pathology Report                         Case: [id number]                                  Authorizing Provider:  Tevin Arciniega MD      Collected:           02/21/2025 08:30 AM          Ordering Location:     [facility] OR  Received:            02/21/2025 08:45 AM                                 [place]                                                                  Pathologist:           Esperanza Callahan MD                                                           Specimen:    Large Intestine, Colon, Transverse, Transverse Colon Polyp                                 Final Diagnosis       TRANSVERSE COLON, POLYP, BIOPSY:  Colonic mucosa with lymphoid follicle; no neoplastic or hyperplastic polyp identified       Clinical Information       Pre-op Diagnosis: Iron deficiency anemia due to chronic blood loss      Gross Description       A(1). Large Intestine, Colon, Transverse, Transverse Colon Polyp:  The specimen is received in formalin with proper patient identification, labeled \"large intestine, transverse colon polyp\".  The specimen consists of a single 0.5 cm in greatest dimension.  Gray-tan, irregular fragment of mucosal tissue.  The specimen is submitted in toto as A1.      Microscopic Description       Microscopic examination has been performed.      I have personally reviewed all specimens and or slides, including the listed special stains, and used them with my medical judgement to determine the final diagnosis.       Performing Labs       The technical component of this testing was completed at [facility].    Stain controls for " all stains resulted within this report have been reviewed and show appropriate reactivity.       Case Images         If you have any questions or concerns, please call the clinic at the number listed above.       Sincerely,          Electronically signed

## 2025-02-24 LAB
PATH REPORT.COMMENTS IMP SPEC: NORMAL
PATH REPORT.COMMENTS IMP SPEC: NORMAL
PATH REPORT.FINAL DX SPEC: NORMAL
PATH REPORT.GROSS SPEC: NORMAL
PATH REPORT.MICROSCOPIC SPEC OTHER STN: NORMAL
PATH REPORT.RELEVANT HX SPEC: NORMAL
PHOTO IMAGE: NORMAL

## 2025-04-09 ENCOUNTER — OFFICE VISIT (OUTPATIENT)
Dept: FAMILY MEDICINE | Facility: CLINIC | Age: 75
End: 2025-04-09
Payer: COMMERCIAL

## 2025-04-09 VITALS
WEIGHT: 116 LBS | OXYGEN SATURATION: 100 % | RESPIRATION RATE: 16 BRPM | HEART RATE: 87 BPM | TEMPERATURE: 98 F | DIASTOLIC BLOOD PRESSURE: 81 MMHG | SYSTOLIC BLOOD PRESSURE: 134 MMHG | BODY MASS INDEX: 19.81 KG/M2 | HEIGHT: 64 IN

## 2025-04-09 DIAGNOSIS — N39.3 FEMALE STRESS INCONTINENCE: ICD-10-CM

## 2025-04-09 DIAGNOSIS — I25.10 CORONARY ARTERY CALCIFICATION: ICD-10-CM

## 2025-04-09 DIAGNOSIS — M81.0 OSTEOPOROSIS, UNSPECIFIED OSTEOPOROSIS TYPE, UNSPECIFIED PATHOLOGICAL FRACTURE PRESENCE: ICD-10-CM

## 2025-04-09 DIAGNOSIS — M54.16 LUMBAR RADICULOPATHY: ICD-10-CM

## 2025-04-09 DIAGNOSIS — Z12.31 ENCOUNTER FOR SCREENING MAMMOGRAM FOR BREAST CANCER: ICD-10-CM

## 2025-04-09 PROCEDURE — 3079F DIAST BP 80-89 MM HG: CPT | Performed by: INTERNAL MEDICINE

## 2025-04-09 PROCEDURE — 99214 OFFICE O/P EST MOD 30 MIN: CPT | Performed by: INTERNAL MEDICINE

## 2025-04-09 PROCEDURE — 1125F AMNT PAIN NOTED PAIN PRSNT: CPT | Performed by: INTERNAL MEDICINE

## 2025-04-09 PROCEDURE — 3075F SYST BP GE 130 - 139MM HG: CPT | Performed by: INTERNAL MEDICINE

## 2025-04-09 RX ORDER — ESTRADIOL 0.1 MG/G
2 CREAM VAGINAL
Qty: 30 G | Refills: 4 | Status: SHIPPED | OUTPATIENT
Start: 2025-04-10

## 2025-04-09 RX ORDER — ROSUVASTATIN CALCIUM 10 MG/1
10 TABLET, COATED ORAL DAILY
Qty: 90 TABLET | Refills: 4 | Status: SHIPPED | OUTPATIENT
Start: 2025-04-09

## 2025-04-09 ASSESSMENT — PAIN SCALES - GENERAL: PAINLEVEL_OUTOF10: MODERATE PAIN (5)

## 2025-04-09 NOTE — PROGRESS NOTES
Assessment & Plan     (N39.3) Female stress incontinence  Comment: Improved with estradiol cream- cont.  Plan: estradiol (ESTRACE VAGINAL) 0.1 MG/GM vaginal         cream    (Z12.31) Encounter for screening mammogram for breast cancer  Comment: Due  Plan: MA Screen Bilateral w/Collin    (I25.10) Coronary artery calcification  Comment: Recommend starting statin- prescribed; if side effects, then switch to pravastatin.  Check labs in 2-3 mos.  Plan: rosuvastatin (CRESTOR) 10 MG tablet    (M54.16) Lumbar radiculopathy  Comment: Right hip pain, now electrical type pain down right leg- not improved with PT >6 wks; waking her from sleep red flag- check MRI and try OMT.  Plan: MR Lumbar Spine w/o Contrast    (M81.0) Osteoporosis, unspecified osteoporosis type, unspecified pathological fracture presence  Comment: Noted- due for repeat DEXA.  Plan: DX Bone Density               Patient Instructions   Because you've gotten sick with the COVID mRNA vaccines in the past, I recommend getting the Novavax COVID vaccine.  You can get this at most retail pharmacies- BUT you need to specify Novavax.  You can also get it at the [facility] pharmacy.      What Is OMT (Osteopathic Manipulative Treatment)?   As part of their education, DOs (doctor of osteopathic medicine) receive special training in the musculoskeletal system (your nerves, bones and muscles).   OMT involves using the hands to diagnose, treat and prevent illness or injury.  Using OMT, your osteopathic physician will move your muscles and joints using techniques including stretching, gentle pressure and resistance.   OMT can help people of all ages and backgrounds. In addition to muscle or joint pain, it can be used to treat a variety of conditions such as asthma, sinus pain, migraines and carpal tunnel syndrome.   For more information, please visit [email or web address]      How does osteopathic manipulative therapy work?   If you're receiving OMT, you should expect a  "doctor to palpate your body with their hands. This means they may press your joints or muscles with their palms or fingers. They might also pull or rotate your limbs, trunk, or head.   Depending on your reason for receiving OMT, you might be asked to apply force as well, such as lifting your arms, while the doctor applies counterpressure.   You could remain in a particular position for 1 or 2 minutes. Sometimes, the doctor will move your body slowly and continuously, and other times they'll move your body quickly.   OMT might occasionally be uncomfortable, but it should never be painful. If you experience any pain during OMT, let your doctor know immediately.      Osteopathic manipulative therapy vs. chiropractic therapy   OMT and chiropractic therapy can look the same superficially, but they have some important differences.   Chiropractic therapy generally places a lot of emphasis on your spine, while OMT includes your entire body. Similarly, the goal of chiropractic therapy is often to alleviate pain in a specific part of your body, while OMT is part of a holistic approach to medicine that stresses that all facets of your body are interconnected, including your mental and emotional states.   Chiropractors must be licensed, but they aren't medical doctors. OMT is performed by a medical doctor.         How to Schedule OMT :   Please make an appointment for \"OMT\" with the .  Please inform them you are scheduling for evaluation for OMT with Dr. Erwin Conway for _right SI joint pain___ (ex. Back pain, neck pain, ect ).       Subjective   Eliana is a 74 year old, presenting for the following health issues:  Musculoskeletal Problem (Hip Pain Referral )        4/9/2025    11:28 AM   Additional Questions   Roomed by Chelsey Harry     History of Present Illness       Back Pain:  She presents for follow up of back pain. Patient's back pain is a chronic problem.  Location of back pain:  Right buttock and right " "hip  Description of back pain: burning and dull ache  Back pain spreads: right buttocks and right thigh    Since patient first noticed back pain, pain is: gradually worsening  Does back pain interfere with her job:  Not applicable       Reason for visit:  Follow up from previous first visit    She eats 2-3 servings of fruits and vegetables daily.She consumes 0 sweetened beverage(s) daily.She exercises with enough effort to increase her heart rate 10 to 19 minutes per day.  She exercises with enough effort to increase her heart rate 4 days per week.   She is taking medications regularly.          Last saw 1/29/25-  - CT calcium score follow up- CT lung granulomatous infection  - Has CLL- sees Dr. Cortez Mcgarry- recommended colonoscopy (polyp, repeat in 7-10 years), then EGD- will check labs next time  - Had intermed ASCVD  - CT Calcium 185  - Having so much pain in right hip.  - Statin    Has had significant right hip pain- [place] MRI doesn't show much arthritis; some calcification on labrum.  Did 3 injections for bursitis.  Has had low back pain for a long time- ever since childbirth 42 years ago.  Now, feels pain down the back of her right leg.  Quadriceps muscles have gotten weaker.  Working on gluts and core.          Objective    /81 (BP Location: Right arm, Patient Position: Sitting, Cuff Size: Adult Small)   Pulse 87   Temp 98  F (36.7  C) (Temporal)   Resp 16   Ht 1.626 m (5' 4\")   Wt 52.6 kg (116 lb)   SpO2 100%   BMI 19.91 kg/m    Body mass index is 19.91 kg/m .  Physical Exam   GENERAL: alert and no distress  MS: no gross musculoskeletal defects noted, no edema; neg straight leg raise, neg RAMONE  PSYCH: mentation appears normal, affect normal/bright            Signed Electronically by: Atiya Hinojosa, DO    "

## 2025-04-09 NOTE — PATIENT INSTRUCTIONS
Because you've gotten sick with the COVID mRNA vaccines in the past, I recommend getting the Novavax COVID vaccine.  You can get this at most retail pharmacies- BUT you need to specify Novavax.  You can also get it at the [facility] pharmacy.      What Is OMT (Osteopathic Manipulative Treatment)?   As part of their education, Attila (doctor of osteopathic medicine) receive special training in the musculoskeletal system (your nerves, bones and muscles).   OMT involves using the hands to diagnose, treat and prevent illness or injury.  Using OMT, your osteopathic physician will move your muscles and joints using techniques including stretching, gentle pressure and resistance.   OMT can help people of all ages and backgrounds. In addition to muscle or joint pain, it can be used to treat a variety of conditions such as asthma, sinus pain, migraines and carpal tunnel syndrome.   For more information, please visit [email or web address]      How does osteopathic manipulative therapy work?   If you're receiving OMT, you should expect a doctor to palpate your body with their hands. This means they may press your joints or muscles with their palms or fingers. They might also pull or rotate your limbs, trunk, or head.   Depending on your reason for receiving OMT, you might be asked to apply force as well, such as lifting your arms, while the doctor applies counterpressure.   You could remain in a particular position for 1 or 2 minutes. Sometimes, the doctor will move your body slowly and continuously, and other times they'll move your body quickly.   OMT might occasionally be uncomfortable, but it should never be painful. If you experience any pain during OMT, let your doctor know immediately.      Osteopathic manipulative therapy vs. chiropractic therapy   OMT and chiropractic therapy can look the same superficially, but they have some important differences.   Chiropractic therapy generally places a lot of emphasis on your  "spine, while OMT includes your entire body. Similarly, the goal of chiropractic therapy is often to alleviate pain in a specific part of your body, while OMT is part of a holistic approach to medicine that stresses that all facets of your body are interconnected, including your mental and emotional states.   Chiropractors must be licensed, but they aren't medical doctors. OMT is performed by a medical doctor.         How to Schedule OMT :   Please make an appointment for \"OMT\" with the .  Please inform them you are scheduling for evaluation for OMT with Dr. Erwin Conway for _right SI joint pain___ (ex. Back pain, neck pain, ect ).     "

## 2025-04-10 ENCOUNTER — PATIENT OUTREACH (OUTPATIENT)
Dept: CARE COORDINATION | Facility: CLINIC | Age: 75
End: 2025-04-10
Payer: COMMERCIAL

## 2025-05-04 ENCOUNTER — MYC MEDICAL ADVICE (OUTPATIENT)
Dept: FAMILY MEDICINE | Facility: CLINIC | Age: 75
End: 2025-05-04
Payer: COMMERCIAL

## 2025-05-05 ENCOUNTER — HOSPITAL ENCOUNTER (OUTPATIENT)
Dept: MRI IMAGING | Facility: HOSPITAL | Age: 75
Discharge: HOME | End: 2025-05-05
Attending: INTERNAL MEDICINE | Admitting: INTERNAL MEDICINE
Payer: COMMERCIAL

## 2025-05-05 DIAGNOSIS — M54.16 LUMBAR RADICULOPATHY: ICD-10-CM

## 2025-05-05 PROCEDURE — 72148 MRI LUMBAR SPINE W/O DYE: CPT

## 2025-05-08 ENCOUNTER — RESULTS FOLLOW-UP (OUTPATIENT)
Dept: FAMILY MEDICINE | Facility: CLINIC | Age: 75
End: 2025-05-08

## 2025-05-09 NOTE — RESULT ENCOUNTER NOTE
Dear Eliana,    Your results show arthritis throughout your spine but fortunately no cancer or really big, obvious areas of spinal cord or nerve compression.  For now, I recommend continuing conservative care to try to help improve your symptoms- like the OMT.    I recommend continuing with the current plan and I really hope your pain improves soon.    Kind regards,    Atiya Hinojosa, DO  Internal Medicine - Pediatrics Physician  [facility]

## 2025-05-28 ENCOUNTER — DOCUMENTATION ONLY (OUTPATIENT)
Dept: FAMILY MEDICINE | Facility: CLINIC | Age: 75
End: 2025-05-28
Payer: COMMERCIAL

## 2025-05-28 NOTE — PROGRESS NOTES
Please see if this date/time works for next OMT session. She has not read her [employer] message. If yes, please help her schedule it.    Monday 6/9 at 4pm (arrival time 340)     Dr. Conway

## 2025-05-28 NOTE — PROGRESS NOTES
Lm on vm indicating my chart message and left message on patients voice mail to respond back either my chart or calling. In regards to the date and time we are offering for her OMT session

## 2025-06-05 ENCOUNTER — ANCILLARY PROCEDURE (OUTPATIENT)
Dept: MAMMOGRAPHY | Facility: HOSPITAL | Age: 75
End: 2025-06-05
Attending: INTERNAL MEDICINE
Payer: COMMERCIAL

## 2025-06-05 DIAGNOSIS — Z12.31 ENCOUNTER FOR SCREENING MAMMOGRAM FOR BREAST CANCER: ICD-10-CM

## 2025-06-05 PROCEDURE — 77063 BREAST TOMOSYNTHESIS BI: CPT

## 2025-06-06 ENCOUNTER — RESULTS FOLLOW-UP (OUTPATIENT)
Dept: FAMILY MEDICINE | Facility: CLINIC | Age: 75
End: 2025-06-06

## 2025-06-09 ENCOUNTER — OFFICE VISIT (OUTPATIENT)
Dept: FAMILY MEDICINE | Facility: CLINIC | Age: 75
End: 2025-06-09
Payer: COMMERCIAL

## 2025-06-09 DIAGNOSIS — G89.29 CHRONIC HIP PAIN, RIGHT: Primary | ICD-10-CM

## 2025-06-09 DIAGNOSIS — M99.05 SOMATIC DYSFUNCTION OF PELVIS REGION: ICD-10-CM

## 2025-06-09 DIAGNOSIS — M25.551 CHRONIC HIP PAIN, RIGHT: Primary | ICD-10-CM

## 2025-06-09 DIAGNOSIS — M79.18 GLUTEAL PAIN: ICD-10-CM

## 2025-06-09 DIAGNOSIS — M53.3 SACROILIAC PAIN: ICD-10-CM

## 2025-06-09 DIAGNOSIS — M99.06 SOMATIC DYSFUNCTION OF LOWER EXTREMITY: ICD-10-CM

## 2025-06-09 PROCEDURE — 99213 OFFICE O/P EST LOW 20 MIN: CPT | Mod: 25 | Performed by: STUDENT IN AN ORGANIZED HEALTH CARE EDUCATION/TRAINING PROGRAM

## 2025-06-09 PROCEDURE — 98925 OSTEOPATH MANJ 1-2 REGIONS: CPT | Performed by: STUDENT IN AN ORGANIZED HEALTH CARE EDUCATION/TRAINING PROGRAM

## 2025-06-09 NOTE — PROGRESS NOTES
HPI      Eliana is a 74 year old who presents today for Osteopathic Evaluation. No chief complaint on file.    Right hip pain, now electrical type pain down right leg- not improved with PT >6 wks; waking her from sleep    MRI lumbar 5/5/25    IMPRESSION:  1.  Mild degenerative lumbar spondylosis with level by level analysis as described above without evidence of high-grade spinal canal or neural foraminal narrowing at any level.    R hip  Did 2 shots (some relief for bursitis)  Did PT x 2  MRI hip mild arthritis-not b[facility]itis [facility]  Mild sciatica down R leg, intermittent  Walking short distances, online exercises (hip raises, 1 legged standing, ect..)  Sees chiro  Feeling weaker    All active medical problems, med list, PMHx, and social Hx updated and reviewed.    Allergies   Allergen Reactions    Contrast Dye Hives and Itching     9/17/10 Developed hives. One on neck, stomach, right side.  No breathing problems.      9/17/10 Developed hives. One on neck, stomach, right side.  No breathing problems.     Review of Systems       ROS      10 point ROS neg other than the symptoms noted above here or in the HPI.    Physical Exam     There were no vitals filed for this visit.      Osteopathic Structural Exam and additional MSK exam found below in OMT Procedure Note.    Assessment and Plan     Diagnoses and all orders for this visit:    Chronic hip pain, right  -     Orthopedic  Referral; Future  -     OSTEOPATHIC MANIP,1-2 BODY REGN    Gluteal pain  -     Orthopedic  Referral; Future  -     OSTEOPATHIC MANIP,1-2 BODY REGN    Sacroiliac pain  -     Orthopedic  Referral; Future  -     OSTEOPATHIC MANIP,1-2 BODY REGN    Somatic dysfunction of lower extremity  -     OSTEOPATHIC MANIP,1-2 BODY REGN    Somatic dysfunction of pelvis region  -     OSTEOPATHIC MANIP,1-2 BODY REGN        Given that this has been interfering with the patient's quality of life and ADLs, after discussion, informed  consent, and medical assessment for safety, we have together decided to address this concern with Osteopathic Manipulative Treatment.    Please see OMT Procedure Note below for the specifics of treatment.         OMT PROCEDURE NOTE      Body Region:  Pelvis/ Innominates  Somatic Dysfunction: ASIS anterior and flared out & tender on right, RAMONE is tight on right; very tender piriformis muscle, QL and gluteal muscles R>L  Treatment: muscle energy to hip flexors, extensors, RAMONE, myofascial and pressure point release  Outcome: Improved, hips level    Body Region: LE   Somatic Dysfunction: minimally tight adductors/abductors  Treatment: muscle energy to adductors, abductors  Outcome: Improved    Pt is having chronic R hip, leg, glute pain. Has tried PT, steroid injections w/o relief. On exam, she is very tender over right IT band, SI joint, piriformis, gluteal muscle, and at the proximal insertion of the hamstring tendon. She is also tender over piriformis muscle and QL on R>L. She has overall good strength is appears hypermobile with her hips and knees. She has had imaging that has ruled out significant hip and lumbar arthritis. I wonder about tendinopathy of hamstring, gluteal tendon or piriformis muscle inflammation. Also wonder about SI arthritis/inflammation. Piriformis could be pinching her sciatic nerve, as she gets intermittent burning over her anterior R thigh.    Plan:  Will send new stretches on mychart focusing on piriformis, QL and gluteal muscles  Recommend handhelf foam roller targeting quads, IT band, hamstrings  Try celebrex 1-2x/day PRN for 2-4 weeks to help reduce inflammation  She will mychart me in 2-4 weeks. If no improvement, consider MRI soft tissue of the pelvis and bones to evaluate for tendinopathy.  Referral to sports medicine      The patient actively participated in OMT and was able to communicate both positive and negative feedback throughout. OMT completed without incident. Patient  tolerated treatment well. Patient reported that ROM, function, and/or pain level were improved. Advised that pain is occasionally worse during the first 24 hours after treatment and that drinking more water and taking Tylenol or Ibuprofen often help. Patient to return in 2-4 week/s or as needed for repeat osteopathic assessment.     Erwin Conway, DO    I spent a total of 35 minutes on the day of the visit.   Time spent by me today doing chart review, history and exam, documentation and further activities per the note      Answers submitted by the patient for this visit:  General Questionnaire (Submitted on 6/9/2025)  Chief Complaint: Chronic problems general questions HPI Form  What is the reason for your visit today? : followup with DO  How many servings of fruits and vegetables do you eat daily?: 4 or more  On average, how many sweetened beverages do you drink each day (Examples: soda, juice, sweet tea, etc.  Do NOT count diet or artificially sweetened beverages)?: 0  How many minutes a day do you exercise enough to make your heart beat faster?: 10 to 19  How many days a week do you exercise enough to make your heart beat faster?: 4  How many days per week do you miss taking your medication?: 0  Questionnaire about: Chronic problems general questions HPI Form (Submitted on 6/9/2025)  Chief Complaint: Chronic problems general questions HPI Form

## 2025-06-10 ENCOUNTER — PATIENT OUTREACH (OUTPATIENT)
Dept: CARE COORDINATION | Facility: CLINIC | Age: 75
End: 2025-06-10
Payer: COMMERCIAL

## 2025-06-12 ENCOUNTER — PATIENT OUTREACH (OUTPATIENT)
Dept: CARE COORDINATION | Facility: CLINIC | Age: 75
End: 2025-06-12
Payer: COMMERCIAL

## 2025-06-22 ENCOUNTER — OFFICE VISIT (OUTPATIENT)
Dept: URGENT CARE | Facility: URGENT CARE | Age: 75
End: 2025-06-22
Payer: COMMERCIAL

## 2025-06-22 ENCOUNTER — MYC MEDICAL ADVICE (OUTPATIENT)
Dept: FAMILY MEDICINE | Facility: CLINIC | Age: 75
End: 2025-06-22

## 2025-06-22 VITALS
SYSTOLIC BLOOD PRESSURE: 129 MMHG | RESPIRATION RATE: 14 BRPM | TEMPERATURE: 98.6 F | HEIGHT: 64 IN | DIASTOLIC BLOOD PRESSURE: 71 MMHG | OXYGEN SATURATION: 99 % | HEART RATE: 88 BPM | BODY MASS INDEX: 19.81 KG/M2 | WEIGHT: 116 LBS

## 2025-06-22 DIAGNOSIS — R19.5 LOOSE STOOLS: Primary | ICD-10-CM

## 2025-06-22 PROCEDURE — 99213 OFFICE O/P EST LOW 20 MIN: CPT | Performed by: FAMILY MEDICINE

## 2025-06-22 PROCEDURE — 3074F SYST BP LT 130 MM HG: CPT | Performed by: FAMILY MEDICINE

## 2025-06-22 PROCEDURE — 3078F DIAST BP <80 MM HG: CPT | Performed by: FAMILY MEDICINE

## 2025-06-22 PROCEDURE — 87493 C DIFF AMPLIFIED PROBE: CPT | Performed by: FAMILY MEDICINE

## 2025-06-22 NOTE — PROGRESS NOTES
"Assessment & Plan     Loose stools  High risk exposure,  currently at home with positive C. difficile.  Patient is having more loose stools, recommend testing.  If positive recommend starting oral vancomycin or fidaxomicin.  Team member will reach out to her to start this regimen.   - C. difficile Toxin B PCR with reflex to C. difficile EIA             Return in about 1 week (around 6/29/2025) for If symptoms do not improve or gets worse..    Vinny Velasquez MD  [facility] [place]    Juan Monroy is a 74 year old female who presents to clinic today for the following health issues:  Chief Complaint   Patient presents with    Diarrhea     Pt wants a c-diff stool test, pt is having soft stools,         6/22/2025    11:36 AM   Additional Questions   Roomed by Melissa   Accompanied by alone     HPI    Patient is a 74-year-old female presents urgent care requesting C. difficile testing, having loose stools, 3 episodes today, nonbloody, no abdominal pain.   with positive C. difficile after using multiple antibiotics for treating urological issues.  She has not been recently on any antibiotics, no recent travel outside the country.      Review of Systems        Objective    /71   Pulse 88   Temp 98.6  F (37  C) (Temporal)   Resp 14   Ht 1.626 m (5' 4\")   Wt 52.6 kg (116 lb)   SpO2 99%   BMI 19.91 kg/m    Physical Exam  Vitals reviewed.   Constitutional:       Appearance: She is not ill-appearing.   Cardiovascular:      Rate and Rhythm: Normal rate and regular rhythm.   Pulmonary:      Effort: Pulmonary effort is normal.      Breath sounds: Normal breath sounds.   Abdominal:      General: Abdomen is flat. There is no distension.                  "

## 2025-06-22 NOTE — PROGRESS NOTES
Urgent Care Clinic Visit    Chief Complaint   Patient presents with    Diarrhea     Pt wants a c-diff stool test, pt is having soft stools,               6/22/2025    11:36 AM   Additional Questions   Roomed by Melissa   Accompanied by alone           Urgent Care Clinic Visit    Chief Complaint   Patient presents with    Diarrhea     Pt wants a c-diff stool test, pt is having soft stools,               6/22/2025    11:36 AM   Additional Questions   Roomed by Melissa   Accompanied by alone

## 2025-06-23 LAB — C DIFF TOX B STL QL: NEGATIVE

## 2025-07-15 ENCOUNTER — OFFICE VISIT (OUTPATIENT)
Dept: FAMILY MEDICINE | Facility: CLINIC | Age: 75
End: 2025-07-15
Payer: COMMERCIAL

## 2025-07-15 VITALS
WEIGHT: 117.1 LBS | BODY MASS INDEX: 20.1 KG/M2 | TEMPERATURE: 98.3 F | RESPIRATION RATE: 15 BRPM | HEART RATE: 74 BPM | DIASTOLIC BLOOD PRESSURE: 60 MMHG | SYSTOLIC BLOOD PRESSURE: 106 MMHG | OXYGEN SATURATION: 97 %

## 2025-07-15 DIAGNOSIS — D50.9 IRON DEFICIENCY ANEMIA, UNSPECIFIED IRON DEFICIENCY ANEMIA TYPE: ICD-10-CM

## 2025-07-15 DIAGNOSIS — E78.2 MIXED HYPERLIPIDEMIA: ICD-10-CM

## 2025-07-15 DIAGNOSIS — M81.0 OSTEOPOROSIS, UNSPECIFIED OSTEOPOROSIS TYPE, UNSPECIFIED PATHOLOGICAL FRACTURE PRESENCE: ICD-10-CM

## 2025-07-15 DIAGNOSIS — F51.01 PRIMARY INSOMNIA: Primary | ICD-10-CM

## 2025-07-15 PROBLEM — M70.61 GREATER TROCHANTERIC BURSITIS OF RIGHT HIP: Status: RESOLVED | Noted: 2024-06-26 | Resolved: 2025-07-15

## 2025-07-15 LAB
ALBUMIN SERPL BCG-MCNC: 4.4 G/DL (ref 3.5–5.2)
ALP SERPL-CCNC: 69 U/L (ref 40–150)
ALT SERPL W P-5'-P-CCNC: 24 U/L (ref 0–50)
ANION GAP SERPL CALCULATED.3IONS-SCNC: 11 MMOL/L (ref 7–15)
AST SERPL W P-5'-P-CCNC: 32 U/L (ref 0–45)
BILIRUB SERPL-MCNC: 0.6 MG/DL
BUN SERPL-MCNC: 8.7 MG/DL (ref 8–23)
CALCIUM SERPL-MCNC: 9.5 MG/DL (ref 8.8–10.4)
CHLORIDE SERPL-SCNC: 100 MMOL/L (ref 98–107)
CHOLEST SERPL-MCNC: 204 MG/DL
CREAT SERPL-MCNC: 0.57 MG/DL (ref 0.51–0.95)
EGFRCR SERPLBLD CKD-EPI 2021: >90 ML/MIN/1.73M2
FASTING STATUS PATIENT QL REPORTED: YES
FASTING STATUS PATIENT QL REPORTED: YES
FERRITIN SERPL-MCNC: 78 NG/ML (ref 11–328)
GLUCOSE SERPL-MCNC: 103 MG/DL (ref 70–99)
HCO3 SERPL-SCNC: 26 MMOL/L (ref 22–29)
HDLC SERPL-MCNC: 89 MG/DL
LDLC SERPL CALC-MCNC: 105 MG/DL
NONHDLC SERPL-MCNC: 115 MG/DL
PLAT MORPH BLD: NORMAL
POTASSIUM SERPL-SCNC: 4.3 MMOL/L (ref 3.4–5.3)
PROT SERPL-MCNC: 6.9 G/DL (ref 6.4–8.3)
RBC MORPH BLD: NORMAL
SODIUM SERPL-SCNC: 137 MMOL/L (ref 135–145)
TRIGL SERPL-MCNC: 50 MG/DL
VIT D+METAB SERPL-MCNC: 57 NG/ML (ref 20–50)

## 2025-07-15 PROCEDURE — 80061 LIPID PANEL: CPT | Performed by: INTERNAL MEDICINE

## 2025-07-15 PROCEDURE — 85025 COMPLETE CBC W/AUTO DIFF WBC: CPT | Performed by: INTERNAL MEDICINE

## 2025-07-15 PROCEDURE — 80053 COMPREHEN METABOLIC PANEL: CPT | Performed by: INTERNAL MEDICINE

## 2025-07-15 PROCEDURE — 99214 OFFICE O/P EST MOD 30 MIN: CPT | Performed by: INTERNAL MEDICINE

## 2025-07-15 PROCEDURE — 82728 ASSAY OF FERRITIN: CPT | Performed by: INTERNAL MEDICINE

## 2025-07-15 PROCEDURE — 3078F DIAST BP <80 MM HG: CPT | Performed by: INTERNAL MEDICINE

## 2025-07-15 PROCEDURE — 1125F AMNT PAIN NOTED PAIN PRSNT: CPT | Performed by: INTERNAL MEDICINE

## 2025-07-15 PROCEDURE — 82306 VITAMIN D 25 HYDROXY: CPT | Performed by: INTERNAL MEDICINE

## 2025-07-15 PROCEDURE — 36415 COLL VENOUS BLD VENIPUNCTURE: CPT | Performed by: INTERNAL MEDICINE

## 2025-07-15 PROCEDURE — 3074F SYST BP LT 130 MM HG: CPT | Performed by: INTERNAL MEDICINE

## 2025-07-15 RX ORDER — TRAZODONE HYDROCHLORIDE 50 MG/1
50 TABLET ORAL
Qty: 100 TABLET | Refills: 4 | Status: SHIPPED | OUTPATIENT
Start: 2025-07-15

## 2025-07-15 ASSESSMENT — PAIN SCALES - GENERAL: PAINLEVEL_OUTOF10: MILD PAIN (3)

## 2025-07-15 NOTE — PROGRESS NOTES
Assessment & Plan     (F51.01) Primary insomnia  (primary encounter diagnosis)  Comment: Trazodone  mg qhs prn sleep.  Plan: traZODone (DESYREL) 50 MG tablet    (M81.0) Osteoporosis, unspecified osteoporosis type, unspecified pathological fracture presence  Comment: Discussed Rx options; dx with osteopenia in 2015, bone density declining since then, saw Endocr previously.  Will consider Fosamax.  Plan: Comprehensive metabolic panel (BMP + Alb, Alk         Phos, ALT, AST, Total. Bili, TP), Physical         Therapy  Referral, Vitamin D         Deficiency    (D50.9) Iron deficiency anemia, unspecified iron deficiency anemia type  Comment: Recheck labs- colonoscopy normal.  Consider EGD if persisting.  Plan: Ferritin, CBC with platelets and differential    (E78.2) Mixed hyperlipidemia  Comment: + coronary artery calcification- started rosuvastatin April 2025- tolerating well, continue.  Plan: Lipid panel reflex to direct LDL Non-fasting           Subjective   Eliana is a 74 year old, presenting for the following health issues:  Follow Up (6/6)        7/15/2025    10:36 AM   Additional Questions   Roomed by AY         7/15/2025    10:36 AM   Patient Reported Additional Medications   Patient reports taking the following new medications none     History of Present Illness       Reason for visit:  Follow up    She eats 2-3 servings of fruits and vegetables daily.She consumes 0 sweetened beverage(s) daily.She exercises with enough effort to increase her heart rate 20 to 29 minutes per day.  She exercises with enough effort to increase her heart rate 4 days per week.   She is taking medications regularly.        DEXA scan results with osteoporosis  Back in 2014- had first DEXA scan- promoted they had recommended medication at that time- recommended for 5 years.  Then in 2023, had another DEXA scan- saw Endocrinologist, was all bone medication.  In 2023, started multivitamin protocol for  osteoprosois.      2014   AP spine (L1-L4) Left hip (neck) Left  hip (total)    BMD (gm/cm2) 0.935 0.587 0.788             T score -1.0 -2.4 -1.3             Z score 0.6 -0.9 -0.1    %change from previous scan dated: 12-  -2.6%  -3.5%      2023  Skeletal Site BMD (gm/cm2) T-Score Z-Score % Change from Previous Scan   dated: 11/12/2014   Spine (L1, L2, L3, L4) 0.884 -1.5 0.7 -5.5%   Left Hip (Total) 0.704 -1.9 -0.4 -10.7%   Left Hip (Femoral neck) 0.530 -2.9 -1.0 N/A     2025  DXA RESULTS  -Lumbar Spine: L1-L2: BMD: 0.852 g/cm2. T-score: -2.6. Z-score: -0.9.   -RIGHT Hip Total: BMD: 0.745 g/cm2. T-score: -2.1. Z-score: -0.4.  -RIGHT Hip Femoral neck: BMD: 0.710 g/cm2. T-score: -2.4. Z-score: -0.5.  -LEFT Hip Total: BMD: 0.703 g/cm2. T-score: -2.4. Z-score: -0.8.  -LEFT Hip Femoral neck: BMD: 0.650 g/cm2. T-score: -2.8. Z-score: -0.9.    Has been using trazodone for sleep- that is working well.    - OMT with Man- went fine, the stretches were already what she was doing.  Referred to sports medicine, doing physical therapy program. Is overall feeling better- hurts frequently, but not as intensely.     Last saw 4/9/25- [facility]- start statin, recheck labs today, lumbar radiculopathy- MRI showed OA/        Objective    /60 (BP Location: Right arm, Patient Position: Sitting, Cuff Size: Adult Regular)   Pulse 74   Temp 98.3  F (36.8  C) (Temporal)   Resp 15   Wt 53.1 kg (117 lb 1.6 oz)   SpO2 97%   BMI 20.10 kg/m    Body mass index is 20.1 kg/m .  Physical Exam   GENERAL: alert and no distress  PSYCH: mentation appears normal, affect normal/bright            Signed Electronically by: Atiya Hinojosa DO

## 2025-07-15 NOTE — PATIENT INSTRUCTIONS
What I would recommend would be to start Fosamax and not continue it longer than 5 years.  Take the Fosamax on an empty stomach with a full glass of water and then stay upright at least 30-60 minutes afterwards.  Let me know if you develop jaw pain or worsening reflux. Please make sure you're taking vitamin D 2000 units of Vitamin D each day.    Something new that is not a medication but may be available more widely soon is Osteoboost (This is not available by prescription yet but you can check out more details here: [email or web address])    CALCIUM Recommendation 1500 mg/day in divided dose of no more than 500 mg/dose. This includes what is in your food and also what is in any supplements you are taking.       Dietary sources of calcium: These also contain vitamin D  Milk / buttermilk              8 oz            300 mg  Dry milk powder       5 Tbsp             300 mg  Yogurt                          1 cup           400 mg  Ice cream                    1/2 cup          100 mg  Hard cheese                     1.5 oz          300 mg  Cottage cheese                1/2 cup        65 mg  Spinach, cooked          1 cup              240 mg  Tofu, soft regular           4 oz          120 to 390 mg  Sardines                       3 oz               370 mg  Mackerel canned         3 oz                250 mg  Canned salmon with bones 3 oz        170-210 mg  Calcium fortified cereals are available  SILK soy and almond milk products are calcium fortified  Orange juice  Fortified with Calcium       8 oz            300 mg  Low fat dairy sources are recommended     Recommended exercise goals:  30 minutes of moderate exercise on most days of the week .  Weight bearing exercise (ie, walking, jogging, hiking, dancing)     Strength training 2 or more times/week in addition to other weight -being exercise.  Strength training -- uses weight or resistance beyond that seen in everyday activities -(pilates, weight training with  free weights, weight machines or resistance bands)     OSTEOPOROSIS of the spine: avoid exercise machines that incorporate spinal flexion, trunk rotation, or forward bending   Specifically avoid abdominal exercisers, stationary bikes with moving handles, cross-country ski machines, rowing machines, and bicep curl machines

## 2025-07-16 LAB
BASOPHILS # BLD AUTO: 0.1 10E3/UL (ref 0–0.2)
BASOPHILS # BLD MANUAL: 0 10E3/UL (ref 0–0.2)
BASOPHILS NFR BLD AUTO: 0 %
BASOPHILS NFR BLD MANUAL: 0 %
EOSINOPHIL # BLD AUTO: 0 10E3/UL (ref 0–0.7)
EOSINOPHIL # BLD MANUAL: 0.1 10E3/UL (ref 0–0.7)
EOSINOPHIL NFR BLD AUTO: 0 %
EOSINOPHIL NFR BLD MANUAL: 1 %
ERYTHROCYTE [DISTWIDTH] IN BLOOD BY AUTOMATED COUNT: 12.4 % (ref 10–15)
HCT VFR BLD AUTO: 39.1 % (ref 35–47)
HGB BLD-MCNC: 13.4 G/DL (ref 11.7–15.7)
IMM GRANULOCYTES # BLD: 0 10E3/UL
IMM GRANULOCYTES NFR BLD: 0 %
LYMPHOCYTES # BLD AUTO: 10.6 10E3/UL (ref 0.8–5.3)
LYMPHOCYTES # BLD MANUAL: 11.5 10E3/UL (ref 0.8–5.3)
LYMPHOCYTES NFR BLD AUTO: 78 %
LYMPHOCYTES NFR BLD MANUAL: 84 %
MCH RBC QN AUTO: 31.8 PG (ref 26.5–33)
MCHC RBC AUTO-ENTMCNC: 34.3 G/DL (ref 31.5–36.5)
MCV RBC AUTO: 93 FL (ref 78–100)
MONOCYTES # BLD AUTO: 0.5 10E3/UL (ref 0–1.3)
MONOCYTES # BLD MANUAL: 0.7 10E3/UL (ref 0–1.3)
MONOCYTES NFR BLD AUTO: 3 %
MONOCYTES NFR BLD MANUAL: 5 %
NEUTROPHILS # BLD AUTO: 2.5 10E3/UL (ref 1.6–8.3)
NEUTROPHILS # BLD MANUAL: 1.4 10E3/UL (ref 1.6–8.3)
NEUTROPHILS NFR BLD AUTO: 18 %
NEUTROPHILS NFR BLD MANUAL: 10 %
NRBC # BLD AUTO: 0 10E3/UL
NRBC BLD AUTO-RTO: 0 /100
PATH REV: ABNORMAL
PLATELET # BLD AUTO: 211 10E3/UL (ref 150–450)
RBC # BLD AUTO: 4.21 10E6/UL (ref 3.8–5.2)
WBC # BLD AUTO: 13.7 10E3/UL (ref 4–11)

## 2025-07-21 LAB
PLAT MORPH BLD: NORMAL
RBC MORPH BLD: NORMAL

## 2025-08-09 ENCOUNTER — OFFICE VISIT (OUTPATIENT)
Dept: URGENT CARE | Facility: URGENT CARE | Age: 75
End: 2025-08-09
Payer: COMMERCIAL

## 2025-08-09 VITALS
OXYGEN SATURATION: 99 % | RESPIRATION RATE: 16 BRPM | WEIGHT: 117 LBS | SYSTOLIC BLOOD PRESSURE: 108 MMHG | HEART RATE: 83 BPM | HEIGHT: 64 IN | TEMPERATURE: 98.6 F | DIASTOLIC BLOOD PRESSURE: 66 MMHG | BODY MASS INDEX: 19.97 KG/M2

## 2025-08-09 DIAGNOSIS — H69.93 DYSFUNCTION OF BOTH EUSTACHIAN TUBES: ICD-10-CM

## 2025-08-09 DIAGNOSIS — J01.90 ACUTE NON-RECURRENT SINUSITIS, UNSPECIFIED LOCATION: Primary | ICD-10-CM

## 2025-08-09 PROCEDURE — 3074F SYST BP LT 130 MM HG: CPT | Performed by: PHYSICIAN ASSISTANT

## 2025-08-09 PROCEDURE — 99213 OFFICE O/P EST LOW 20 MIN: CPT | Performed by: PHYSICIAN ASSISTANT

## 2025-08-09 PROCEDURE — 3078F DIAST BP <80 MM HG: CPT | Performed by: PHYSICIAN ASSISTANT

## (undated) DEVICE — SPECIMEN CONTAINER 3OZ W/FORMALIN 59901

## (undated) DEVICE — TUBING SUCTION MEDI-VAC 1/4"X20' N620A

## (undated) DEVICE — ENDO FORCEP BX CAPTURA PRO SPIKE G50696

## (undated) DEVICE — SUCTION MANIFOLD NEPTUNE 2 SYS 1 PORT 702-025-000

## (undated) DEVICE — SOL WATER IRRIG 500ML BOTTLE 2F7113

## (undated) DEVICE — GOWN IMPERVIOUS 2XL BLUE

## (undated) DEVICE — KIT ENDO TURNOVER/PROCEDURE CARRY-ON 101822